# Patient Record
Sex: MALE | Race: WHITE | NOT HISPANIC OR LATINO
[De-identification: names, ages, dates, MRNs, and addresses within clinical notes are randomized per-mention and may not be internally consistent; named-entity substitution may affect disease eponyms.]

---
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
## 2020-01-27 ENCOUNTER — CLINICAL ADVICE (OUTPATIENT)
Age: 20
End: 2020-01-27

## 2020-03-23 ENCOUNTER — RX RENEWAL (OUTPATIENT)
Age: 20
End: 2020-03-23

## 2020-05-29 ENCOUNTER — APPOINTMENT (OUTPATIENT)
Dept: GASTROENTEROLOGY | Facility: CLINIC | Age: 20
End: 2020-05-29

## 2020-06-02 ENCOUNTER — APPOINTMENT (OUTPATIENT)
Dept: GASTROENTEROLOGY | Facility: CLINIC | Age: 20
End: 2020-06-02
Payer: COMMERCIAL

## 2020-06-02 PROCEDURE — 99203 OFFICE O/P NEW LOW 30 MIN: CPT | Mod: 95

## 2020-06-02 NOTE — HISTORY OF PRESENT ILLNESS
[de-identified] : telehealth\par patient consents\par patient at home\par MD at [address] [place], [place]\par \par 20 yr male, symptoms age 14, diagnosed CD right colon age 15\par Hospitalizations\par Prednisone\par Remicade, did well for 6 months, developed antibodies\par Since 2016 on Humira with MTX,folic acid\par Stopped MTX during pandemic, still doing well\par Humira weekly\par Last colonoscopy without gross disease activity 2017\par Persistent mild calprotectin elevation\par \par Without anal disease\par without EIM\par NO family history of IBD\par \par SH \par Non smoker\par [place] student

## 2020-06-02 NOTE — ASSESSMENT
[FreeTextEntry1] : Stable CD colon\par Likely mild chronic , subclincal activity\par \par Plan\par Continue Humira weekly\par Check labs including drug and antibody levels\par Calprotectin\par OV 6 months\par \par Patient referred by Dr Cj Caballero

## 2020-06-16 LAB
ALBUMIN SERPL ELPH-MCNC: 4.8 G/DL
ALP BLD-CCNC: 66 U/L
ALT SERPL-CCNC: 13 U/L
ANION GAP SERPL CALC-SCNC: 13 MMOL/L
AST SERPL-CCNC: 17 U/L
BASOPHILS # BLD AUTO: 0.05 K/UL
BASOPHILS NFR BLD AUTO: 1 %
BILIRUB SERPL-MCNC: 0.2 MG/DL
BUN SERPL-MCNC: 22 MG/DL
CALCIUM SERPL-MCNC: 9.6 MG/DL
CHLORIDE SERPL-SCNC: 102 MMOL/L
CO2 SERPL-SCNC: 25 MMOL/L
CREAT SERPL-MCNC: 0.84 MG/DL
CRP SERPL-MCNC: <0.1 MG/DL
EOSINOPHIL # BLD AUTO: 0.12 K/UL
EOSINOPHIL NFR BLD AUTO: 2.5 %
GLUCOSE SERPL-MCNC: 94 MG/DL
HBV CORE IGG+IGM SER QL: NONREACTIVE
HBV SURFACE AB SER QL: NONREACTIVE
HBV SURFACE AG SER QL: NONREACTIVE
HCT VFR BLD CALC: 46.7 %
HGB BLD-MCNC: 14.7 G/DL
IMM GRANULOCYTES NFR BLD AUTO: 0.2 %
LYMPHOCYTES # BLD AUTO: 1.57 K/UL
LYMPHOCYTES NFR BLD AUTO: 32.7 %
MAN DIFF?: NORMAL
MCHC RBC-ENTMCNC: 27.8 PG
MCHC RBC-ENTMCNC: 31.5 GM/DL
MCV RBC AUTO: 88.4 FL
MONOCYTES # BLD AUTO: 0.36 K/UL
MONOCYTES NFR BLD AUTO: 7.5 %
NEUTROPHILS # BLD AUTO: 2.69 K/UL
NEUTROPHILS NFR BLD AUTO: 56.1 %
PLATELET # BLD AUTO: 252 K/UL
POTASSIUM SERPL-SCNC: 4.4 MMOL/L
PROT SERPL-MCNC: 7.2 G/DL
RBC # BLD: 5.28 M/UL
RBC # FLD: 12.8 %
SODIUM SERPL-SCNC: 140 MMOL/L
WBC # FLD AUTO: 4.8 K/UL

## 2020-06-17 LAB
M TB IFN-G BLD-IMP: NEGATIVE
QUANTIFERON TB PLUS MITOGEN MINUS NIL: 6.97 IU/ML
QUANTIFERON TB PLUS NIL: 0.01 IU/ML
QUANTIFERON TB PLUS TB1 MINUS NIL: 0 IU/ML
QUANTIFERON TB PLUS TB2 MINUS NIL: -0.01 IU/ML

## 2020-06-22 LAB
ADALIMUMAB AB SERPL-MCNC: <25 NG/ML
ADALIMUMAB SERPL-MCNC: 16 UG/ML
CALPROTECTIN FECAL: 243 UG/G

## 2020-10-29 ENCOUNTER — NON-APPOINTMENT (OUTPATIENT)
Age: 20
End: 2020-10-29

## 2020-10-29 LAB
25(OH)D3 SERPL-MCNC: 60.3 NG/ML
ALBUMIN SERPL ELPH-MCNC: 4.9 G/DL
ALP BLD-CCNC: 59 U/L
ALT SERPL-CCNC: 12 U/L
ANION GAP SERPL CALC-SCNC: 12 MMOL/L
AST SERPL-CCNC: 19 U/L
BASOPHILS # BLD AUTO: 0.04 K/UL
BASOPHILS NFR BLD AUTO: 0.8 %
BILIRUB SERPL-MCNC: 0.3 MG/DL
BUN SERPL-MCNC: 18 MG/DL
CALCIUM SERPL-MCNC: 10 MG/DL
CHLORIDE SERPL-SCNC: 101 MMOL/L
CO2 SERPL-SCNC: 28 MMOL/L
CREAT SERPL-MCNC: 0.87 MG/DL
CRP SERPL-MCNC: <0.1 MG/DL
EOSINOPHIL # BLD AUTO: 0.1 K/UL
EOSINOPHIL NFR BLD AUTO: 1.9 %
GLUCOSE SERPL-MCNC: 90 MG/DL
HCT VFR BLD CALC: 47.1 %
HGB BLD-MCNC: 14.9 G/DL
IMM GRANULOCYTES NFR BLD AUTO: 0 %
LYMPHOCYTES # BLD AUTO: 2.29 K/UL
LYMPHOCYTES NFR BLD AUTO: 44.5 %
MAN DIFF?: NORMAL
MCHC RBC-ENTMCNC: 28.4 PG
MCHC RBC-ENTMCNC: 31.6 GM/DL
MCV RBC AUTO: 89.9 FL
MONOCYTES # BLD AUTO: 0.36 K/UL
MONOCYTES NFR BLD AUTO: 7 %
NEUTROPHILS # BLD AUTO: 2.36 K/UL
NEUTROPHILS NFR BLD AUTO: 45.8 %
PLATELET # BLD AUTO: 232 K/UL
POTASSIUM SERPL-SCNC: 4.3 MMOL/L
PROT SERPL-MCNC: 7.4 G/DL
RBC # BLD: 5.24 M/UL
RBC # FLD: 12.2 %
SODIUM SERPL-SCNC: 141 MMOL/L
VIT B12 SERPL-MCNC: >2000 PG/ML
WBC # FLD AUTO: 5.15 K/UL

## 2020-11-03 LAB — CALPROTECTIN FECAL: 120 UG/G

## 2020-11-05 LAB
ADALIMUMAB AB SERPL-MCNC: <25 NG/ML
ADALIMUMAB SERPL-MCNC: 13 UG/ML

## 2020-11-06 ENCOUNTER — APPOINTMENT (OUTPATIENT)
Dept: DISASTER EMERGENCY | Facility: CLINIC | Age: 20
End: 2020-11-06

## 2020-11-06 ENCOUNTER — LABORATORY RESULT (OUTPATIENT)
Age: 20
End: 2020-11-06

## 2020-11-06 DIAGNOSIS — Z01.818 ENCOUNTER FOR OTHER PREPROCEDURAL EXAMINATION: ICD-10-CM

## 2020-11-07 PROBLEM — Z01.818 PREOP TESTING: Status: ACTIVE | Noted: 2020-11-07

## 2020-11-11 ENCOUNTER — APPOINTMENT (OUTPATIENT)
Dept: GASTROENTEROLOGY | Facility: AMBULATORY MEDICAL SERVICES | Age: 20
End: 2020-11-11
Payer: COMMERCIAL

## 2020-11-11 PROCEDURE — 45385 COLONOSCOPY W/LESION REMOVAL: CPT

## 2020-11-11 PROCEDURE — 45380 COLONOSCOPY AND BIOPSY: CPT | Mod: 59

## 2020-11-17 ENCOUNTER — NON-APPOINTMENT (OUTPATIENT)
Age: 20
End: 2020-11-17

## 2021-01-20 ENCOUNTER — NON-APPOINTMENT (OUTPATIENT)
Age: 21
End: 2021-01-20

## 2021-03-26 ENCOUNTER — APPOINTMENT (OUTPATIENT)
Dept: GASTROENTEROLOGY | Facility: CLINIC | Age: 21
End: 2021-03-26

## 2021-03-27 LAB
25(OH)D3 SERPL-MCNC: 52.6 NG/ML
ALBUMIN SERPL ELPH-MCNC: 4.9 G/DL
ALP BLD-CCNC: 57 U/L
ALT SERPL-CCNC: 12 U/L
ANION GAP SERPL CALC-SCNC: 10 MMOL/L
AST SERPL-CCNC: 19 U/L
BASOPHILS # BLD AUTO: 0.04 K/UL
BASOPHILS NFR BLD AUTO: 0.7 %
BILIRUB SERPL-MCNC: 0.3 MG/DL
BUN SERPL-MCNC: 18 MG/DL
CALCIUM SERPL-MCNC: 9.7 MG/DL
CHLORIDE SERPL-SCNC: 103 MMOL/L
CO2 SERPL-SCNC: 26 MMOL/L
CREAT SERPL-MCNC: 0.77 MG/DL
CRP SERPL-MCNC: <3 MG/L
EOSINOPHIL # BLD AUTO: 0.11 K/UL
EOSINOPHIL NFR BLD AUTO: 1.8 %
GLUCOSE SERPL-MCNC: 110 MG/DL
HBV SURFACE AG SER QL: NONREACTIVE
HCT VFR BLD CALC: 43.4 %
HGB BLD-MCNC: 14 G/DL
IMM GRANULOCYTES NFR BLD AUTO: 0.2 %
LYMPHOCYTES # BLD AUTO: 1.43 K/UL
LYMPHOCYTES NFR BLD AUTO: 23.7 %
MAN DIFF?: NORMAL
MCHC RBC-ENTMCNC: 27.7 PG
MCHC RBC-ENTMCNC: 32.3 GM/DL
MCV RBC AUTO: 85.9 FL
MONOCYTES # BLD AUTO: 0.31 K/UL
MONOCYTES NFR BLD AUTO: 5.1 %
NEUTROPHILS # BLD AUTO: 4.14 K/UL
NEUTROPHILS NFR BLD AUTO: 68.5 %
PLATELET # BLD AUTO: 248 K/UL
POTASSIUM SERPL-SCNC: 4.1 MMOL/L
PROT SERPL-MCNC: 7.4 G/DL
RBC # BLD: 5.05 M/UL
RBC # FLD: 12.3 %
SODIUM SERPL-SCNC: 140 MMOL/L
VIT B12 SERPL-MCNC: >2000 PG/ML
WBC # FLD AUTO: 6.04 K/UL

## 2021-03-28 LAB
M TB IFN-G BLD-IMP: NEGATIVE
QUANTIFERON TB PLUS MITOGEN MINUS NIL: 7.32 IU/ML
QUANTIFERON TB PLUS NIL: 0.01 IU/ML
QUANTIFERON TB PLUS TB1 MINUS NIL: 0 IU/ML
QUANTIFERON TB PLUS TB2 MINUS NIL: 0 IU/ML

## 2021-03-31 LAB — CALPROTECTIN FECAL: 101 UG/G

## 2021-04-08 ENCOUNTER — APPOINTMENT (OUTPATIENT)
Dept: GASTROENTEROLOGY | Facility: CLINIC | Age: 21
End: 2021-04-08
Payer: COMMERCIAL

## 2021-04-08 VITALS
BODY MASS INDEX: 21.97 KG/M2 | TEMPERATURE: 98.1 F | HEIGHT: 67 IN | HEART RATE: 71 BPM | OXYGEN SATURATION: 96 % | DIASTOLIC BLOOD PRESSURE: 62 MMHG | RESPIRATION RATE: 16 BRPM | SYSTOLIC BLOOD PRESSURE: 106 MMHG | WEIGHT: 140 LBS

## 2021-04-08 PROCEDURE — 99213 OFFICE O/P EST LOW 20 MIN: CPT

## 2021-04-08 PROCEDURE — 99072 ADDL SUPL MATRL&STAF TM PHE: CPT

## 2021-04-08 NOTE — HISTORY OF PRESENT ILLNESS
[de-identified] : 21-year-old male Crohn's disease\par Prior history of Remicade use, antibody development\par Switch to Humira and methotrexate\par Currently on Humira monotherapy\par Colonoscopy on Humira monotherapy November 2020\par Some inflammatory activity of the cecum, normal ileum, otherwise normal exam\par Patient with regular bowel habit\par Recent check a fecal calprotectin in the normal range

## 2021-04-08 NOTE — ASSESSMENT
[FreeTextEntry1] : 21-year-old male, clinically well controlled Crohn's disease\par \par Plan\par Humira renewed\par Office visit 6 months

## 2021-04-12 ENCOUNTER — NON-APPOINTMENT (OUTPATIENT)
Age: 21
End: 2021-04-12

## 2021-04-12 LAB
ADALIMUMAB AB SERPL-MCNC: <25 NG/ML
ADALIMUMAB SERPL-MCNC: 20 UG/ML

## 2021-04-22 ENCOUNTER — RX RENEWAL (OUTPATIENT)
Age: 21
End: 2021-04-22

## 2021-09-21 ENCOUNTER — NON-APPOINTMENT (OUTPATIENT)
Age: 21
End: 2021-09-21

## 2021-09-22 LAB — CALPROTECTIN FECAL: 325 UG/G

## 2021-10-01 ENCOUNTER — APPOINTMENT (OUTPATIENT)
Dept: GASTROENTEROLOGY | Facility: CLINIC | Age: 21
End: 2021-10-01
Payer: COMMERCIAL

## 2021-10-01 VITALS
HEART RATE: 69 BPM | TEMPERATURE: 98.7 F | DIASTOLIC BLOOD PRESSURE: 60 MMHG | HEIGHT: 67 IN | OXYGEN SATURATION: 98 % | WEIGHT: 147 LBS | BODY MASS INDEX: 23.07 KG/M2 | SYSTOLIC BLOOD PRESSURE: 110 MMHG

## 2021-10-01 PROCEDURE — 99214 OFFICE O/P EST MOD 30 MIN: CPT

## 2021-10-01 RX ORDER — FOLIC ACID 1 MG/1
1 TABLET ORAL
Qty: 90 | Refills: 3 | Status: DISCONTINUED | COMMUNITY
Start: 2018-03-12 | End: 2021-10-01

## 2021-10-01 RX ORDER — METHOTREXATE 2.5 MG/1
2.5 TABLET ORAL
Qty: 24 | Refills: 5 | Status: DISCONTINUED | COMMUNITY
Start: 2018-12-19 | End: 2021-10-01

## 2021-10-01 NOTE — HISTORY OF PRESENT ILLNESS
[de-identified] : 21-year-old male\par Crohn's disease diagnosed at age 15\par Hospitalizations\par Prednisone\par Remicade for 6 months, did well, developed antibodies\par Subsequently did well on Humira combined with methotrexate and folic acid\par Methotrexate discontinued at the beginning of pandemic\par Currently on Humira weekly\par \par Patient continues to feel well\par Regular bowel habit\par Perhaps some weight gain\par Fecal calprotectin slightly elevated 325 as compared to previous value March 2021, 101, level of 243 back in June 2020\par  last colonoscopy November 2020 showed normal terminal ileum, mild mucosal erythema of the cecum\par \par Social history College student

## 2021-10-01 NOTE — PHYSICAL EXAM
[General Appearance - Alert] : alert [General Appearance - In No Acute Distress] : in no acute distress [Bowel Sounds] : normal bowel sounds [Abdomen Soft] : soft [Abdomen Tenderness] : non-tender [] : no hepato-splenomegaly [Abdomen Mass (___ Cm)] : no abdominal mass palpated [Oriented To Time, Place, And Person] : oriented to person, place, and time [Impaired Insight] : insight and judgment were intact [Affect] : the affect was normal

## 2021-10-01 NOTE — ASSESSMENT
[FreeTextEntry1] : Crohn's disease\par Bump, elevation of calprotectin, but patient clinically continues to feel very well\par \par Plan\par Continue Humira weekly\par Routine blood testing today\par Office visit 6 months, sooner as needed

## 2021-10-03 LAB — CRP SERPL-MCNC: <3 MG/L

## 2021-10-04 ENCOUNTER — TRANSCRIPTION ENCOUNTER (OUTPATIENT)
Age: 21
End: 2021-10-04

## 2021-10-06 PROBLEM — K62.5 BLEEDING PER RECTUM: Status: ACTIVE | Noted: 2017-06-06

## 2021-10-12 ENCOUNTER — NON-APPOINTMENT (OUTPATIENT)
Age: 21
End: 2021-10-12

## 2021-11-16 ENCOUNTER — RX RENEWAL (OUTPATIENT)
Age: 21
End: 2021-11-16

## 2021-12-15 ENCOUNTER — RX RENEWAL (OUTPATIENT)
Age: 21
End: 2021-12-15

## 2022-02-09 ENCOUNTER — NON-APPOINTMENT (OUTPATIENT)
Age: 22
End: 2022-02-09

## 2022-02-17 ENCOUNTER — NON-APPOINTMENT (OUTPATIENT)
Age: 22
End: 2022-02-17

## 2022-03-29 ENCOUNTER — NON-APPOINTMENT (OUTPATIENT)
Age: 22
End: 2022-03-29

## 2022-04-07 LAB
ALBUMIN SERPL ELPH-MCNC: 4.4 G/DL
ALP BLD-CCNC: 61 U/L
ALT SERPL-CCNC: 15 U/L
ANION GAP SERPL CALC-SCNC: 14 MMOL/L
AST SERPL-CCNC: 24 U/L
BASOPHILS # BLD AUTO: 0.07 K/UL
BASOPHILS NFR BLD AUTO: 1 %
BILIRUB SERPL-MCNC: 0.2 MG/DL
BUN SERPL-MCNC: 20 MG/DL
CALCIUM SERPL-MCNC: 9.4 MG/DL
CHLORIDE SERPL-SCNC: 102 MMOL/L
CO2 SERPL-SCNC: 24 MMOL/L
CREAT SERPL-MCNC: 0.9 MG/DL
CRP SERPL-MCNC: 5 MG/L
EGFR: 124 ML/MIN/1.73M2
EOSINOPHIL # BLD AUTO: 0.14 K/UL
EOSINOPHIL NFR BLD AUTO: 2.1 %
GLUCOSE SERPL-MCNC: 94 MG/DL
HCT VFR BLD CALC: 41.7 %
HGB BLD-MCNC: 13.3 G/DL
IMM GRANULOCYTES NFR BLD AUTO: 0.4 %
LYMPHOCYTES # BLD AUTO: 1.89 K/UL
LYMPHOCYTES NFR BLD AUTO: 27.8 %
MAN DIFF?: NORMAL
MCHC RBC-ENTMCNC: 28.4 PG
MCHC RBC-ENTMCNC: 31.9 GM/DL
MCV RBC AUTO: 88.9 FL
MONOCYTES # BLD AUTO: 0.48 K/UL
MONOCYTES NFR BLD AUTO: 7 %
NEUTROPHILS # BLD AUTO: 4.2 K/UL
NEUTROPHILS NFR BLD AUTO: 61.7 %
PLATELET # BLD AUTO: 258 K/UL
POTASSIUM SERPL-SCNC: 4 MMOL/L
PROT SERPL-MCNC: 7.3 G/DL
RBC # BLD: 4.69 M/UL
RBC # FLD: 13 %
SODIUM SERPL-SCNC: 139 MMOL/L
WBC # FLD AUTO: 6.81 K/UL

## 2022-04-11 LAB — CALPROTECTIN FECAL: 418 UG/G

## 2022-04-15 ENCOUNTER — APPOINTMENT (OUTPATIENT)
Dept: GASTROENTEROLOGY | Facility: CLINIC | Age: 22
End: 2022-04-15
Payer: COMMERCIAL

## 2022-04-15 ENCOUNTER — TRANSCRIPTION ENCOUNTER (OUTPATIENT)
Age: 22
End: 2022-04-15

## 2022-04-15 VITALS
TEMPERATURE: 97.7 F | OXYGEN SATURATION: 98 % | DIASTOLIC BLOOD PRESSURE: 72 MMHG | HEART RATE: 75 BPM | HEIGHT: 67 IN | BODY MASS INDEX: 23.54 KG/M2 | WEIGHT: 150 LBS | SYSTOLIC BLOOD PRESSURE: 110 MMHG

## 2022-04-15 PROCEDURE — 99214 OFFICE O/P EST MOD 30 MIN: CPT

## 2022-04-15 NOTE — PHYSICAL EXAM
[General Appearance - In No Acute Distress] : in no acute distress [General Appearance - Alert] : alert [Bowel Sounds] : normal bowel sounds [Abdomen Soft] : soft [Abdomen Tenderness] : non-tender [] : no hepato-splenomegaly [Oriented To Time, Place, And Person] : oriented to person, place, and time [Abdomen Mass (___ Cm)] : no abdominal mass palpated [Impaired Insight] : insight and judgment were intact [Affect] : the affect was normal

## 2022-04-15 NOTE — HISTORY OF PRESENT ILLNESS
[de-identified] : 22-year-old male\par Diagnosed Crohn's disease at age 15\par Ileocolic\par Treated with Remicade\par Did well for 6 months  until antibody development, infusion reaction\par Subsequently, successfully treated with Humira, methotrexate and folic acid\par Methotrexate discontinued at the beginning of pandemic\par \par Last colonoscopy November 2020\par Normal terminal ileum\par Mild mucosal erythema of the cecum\par Extensive pseudopolyps\par Inflammatory polyp\par \par Patient continues to feel well, some weight gain regular bowel habit no abdominal pain\par Mild rise of calprotectin again noted, now slightly over 400 CRP of 5 rest of blood testing unremarkable\par Humira trough level 1 year ago 20, no antibodies\par \par Student at [employer], [place], 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

## 2022-04-15 NOTE — ASSESSMENT
[FreeTextEntry1] : Crohn's disease\par Humira weekly\par Prior history of Remicade, antibodies, treatment failure\par Patient currently off methotrexate\par Slight rise of calprotectin and CRP noted\par \par Plan\par No immediate change of therapy\par Blood testing in the next day or 2 for Humira trough\par Telephone follow-up when results available\par Plan for office visit in the summer to set up surveillance colonoscopy for the fall

## 2022-04-21 ENCOUNTER — NON-APPOINTMENT (OUTPATIENT)
Age: 22
End: 2022-04-21

## 2022-04-21 LAB
ANTIBODIES TO ADALIMUMAB (ATA) CONCENTRATION: < 1.7 U/ML
PROMETHEUS ANSER ADA: NORMAL
PROMETHEUS LABORATORY FOOTER: NORMAL
SERUM ADALIMUMAB (ADA) CONCENTRATION: 21.1 UG/ML

## 2022-06-03 ENCOUNTER — NON-APPOINTMENT (OUTPATIENT)
Age: 22
End: 2022-06-03

## 2022-06-07 ENCOUNTER — NON-APPOINTMENT (OUTPATIENT)
Age: 22
End: 2022-06-07

## 2022-08-18 ENCOUNTER — APPOINTMENT (OUTPATIENT)
Dept: GASTROENTEROLOGY | Facility: CLINIC | Age: 22
End: 2022-08-18

## 2022-08-18 VITALS
OXYGEN SATURATION: 98 % | SYSTOLIC BLOOD PRESSURE: 128 MMHG | DIASTOLIC BLOOD PRESSURE: 60 MMHG | WEIGHT: 150 LBS | HEART RATE: 66 BPM | BODY MASS INDEX: 22.73 KG/M2 | HEIGHT: 68 IN

## 2022-08-18 PROCEDURE — 99214 OFFICE O/P EST MOD 30 MIN: CPT

## 2022-08-18 NOTE — ASSESSMENT
[FreeTextEntry1] : Crohn's colitis\par Due for surveillance\par \par \par Plan\par Recheck calprotectin\par Update blood work including tuberculosis screen, recheck for hepatitis B serologies, varicella status\par Indications risks benefits and alternatives to colonoscopy reviewed\par Patient agreeable to examination

## 2022-08-18 NOTE — HISTORY OF PRESENT ILLNESS
[de-identified] : 22-year-old male\par History of severe Crohn's colitis\par Previously on methotrexate, Humira weekly\par Methotrexate stopped during pandemic, Humira decreased from weekly to every other week\par Last drug level greater than 20\par \par Past history of hospitalizations, prednisone, Remicade, antibodies to Remicade\par \par Last colonoscopy November 2020\par Extensive pseudopolyps\par Normal terminal ileum\par Mild mucosal erythema of the cecum\par \par At last check patient noted to have rising fecal calprotectin 418 April 2022\par Since that time, continues to feel well\par

## 2022-08-19 LAB
HBV CORE IGG+IGM SER QL: NONREACTIVE
HBV SURFACE AB SER QL: NONREACTIVE
HBV SURFACE AG SER QL: NONREACTIVE
VZV AB TITR SER: NEGATIVE
VZV IGG SER IF-ACNC: 123.3 INDEX

## 2022-09-17 LAB — CALPROTECTIN FECAL: 472 UG/G

## 2022-09-19 ENCOUNTER — NON-APPOINTMENT (OUTPATIENT)
Age: 22
End: 2022-09-19

## 2022-11-06 LAB — SARS-COV-2 N GENE NPH QL NAA+PROBE: NOT DETECTED

## 2022-11-09 ENCOUNTER — NON-APPOINTMENT (OUTPATIENT)
Age: 22
End: 2022-11-09

## 2022-11-09 ENCOUNTER — APPOINTMENT (OUTPATIENT)
Dept: GASTROENTEROLOGY | Facility: AMBULATORY MEDICAL SERVICES | Age: 22
End: 2022-11-09

## 2022-11-09 PROCEDURE — 45380 COLONOSCOPY AND BIOPSY: CPT

## 2022-11-15 ENCOUNTER — NON-APPOINTMENT (OUTPATIENT)
Age: 22
End: 2022-11-15

## 2022-11-16 ENCOUNTER — NON-APPOINTMENT (OUTPATIENT)
Age: 22
End: 2022-11-16

## 2022-11-18 ENCOUNTER — NON-APPOINTMENT (OUTPATIENT)
Age: 22
End: 2022-11-18

## 2023-02-26 LAB — CALPROTECTIN FECAL: 212 UG/G

## 2023-03-03 ENCOUNTER — APPOINTMENT (OUTPATIENT)
Dept: GASTROENTEROLOGY | Facility: CLINIC | Age: 23
End: 2023-03-03
Payer: COMMERCIAL

## 2023-03-03 VITALS
OXYGEN SATURATION: 98 % | DIASTOLIC BLOOD PRESSURE: 81 MMHG | HEART RATE: 70 BPM | SYSTOLIC BLOOD PRESSURE: 147 MMHG | TEMPERATURE: 98.3 F | HEIGHT: 68 IN | BODY MASS INDEX: 21.98 KG/M2 | WEIGHT: 145 LBS

## 2023-03-03 PROCEDURE — 99213 OFFICE O/P EST LOW 20 MIN: CPT

## 2023-03-03 NOTE — REASON FOR VISIT
[Follow-up] : a follow-up of an existing diagnosis [Parent] : parent [FreeTextEntry1] : Crohn's disease

## 2023-03-03 NOTE — ASSESSMENT
[FreeTextEntry1] : Crohn's colitis\par Continues to be asymptomatic throughout all medication changes, however some objective signs of improving disease activity in the form of declining calprotectin\par \par Plan\par Routine blood testing, follow-up\par Office visit again in 6 months
0

## 2023-03-03 NOTE — HISTORY OF PRESENT ILLNESS
[FreeTextEntry1] : 23-year-old male\par History of Crohn's colitis\par Previously methotrexate and Humira\par Methotrexate discontinued during pandemic\par Humira subsequently decreased from weekly to every other week following high therapeutic level of 20\par Last colonoscopy performed November 2022\par Normal terminal ileum, ulceration erythema of the cecum and the ascending colon, pseudopolyps, active Crohn's disease\par Calprotectin September 2022 472\par \par Since his colonoscopy patient back on Humira weekly\par Continues to feel well\par Fecal calprotectin now down to 212\par \par Patient due for blood work\par \par Social history: Graduating soon from [employer]\par

## 2023-03-05 LAB
24R-OH-CALCIDIOL SERPL-MCNC: 68.6 PG/ML
ALBUMIN SERPL ELPH-MCNC: 4.8 G/DL
ALP BLD-CCNC: 82 U/L
ALT SERPL-CCNC: 16 U/L
ANION GAP SERPL CALC-SCNC: 12 MMOL/L
AST SERPL-CCNC: 23 U/L
BASOPHILS # BLD AUTO: 0.1 K/UL
BASOPHILS NFR BLD AUTO: 1.3 %
BILIRUB SERPL-MCNC: 0.2 MG/DL
BUN SERPL-MCNC: 23 MG/DL
CALCIUM SERPL-MCNC: 10.1 MG/DL
CHLORIDE SERPL-SCNC: 101 MMOL/L
CO2 SERPL-SCNC: 28 MMOL/L
CREAT SERPL-MCNC: 0.84 MG/DL
CRP SERPL-MCNC: 5 MG/L
EGFR: 126 ML/MIN/1.73M2
EOSINOPHIL # BLD AUTO: 1.26 K/UL
EOSINOPHIL NFR BLD AUTO: 16.6 %
GLUCOSE SERPL-MCNC: 86 MG/DL
HCT VFR BLD CALC: 42.5 %
HGB BLD-MCNC: 13.6 G/DL
IMM GRANULOCYTES NFR BLD AUTO: 0.4 %
LYMPHOCYTES # BLD AUTO: 1.94 K/UL
LYMPHOCYTES NFR BLD AUTO: 25.6 %
MAN DIFF?: NORMAL
MCHC RBC-ENTMCNC: 27.9 PG
MCHC RBC-ENTMCNC: 32 GM/DL
MCV RBC AUTO: 87.1 FL
MONOCYTES # BLD AUTO: 0.48 K/UL
MONOCYTES NFR BLD AUTO: 6.3 %
NEUTROPHILS # BLD AUTO: 3.78 K/UL
NEUTROPHILS NFR BLD AUTO: 49.8 %
PLATELET # BLD AUTO: 285 K/UL
POTASSIUM SERPL-SCNC: 4.5 MMOL/L
PROT SERPL-MCNC: 7.6 G/DL
RBC # BLD: 4.88 M/UL
RBC # FLD: 13.5 %
SODIUM SERPL-SCNC: 140 MMOL/L
VIT B12 SERPL-MCNC: >2000 PG/ML
WBC # FLD AUTO: 7.59 K/UL

## 2023-04-20 ENCOUNTER — NON-APPOINTMENT (OUTPATIENT)
Age: 23
End: 2023-04-20

## 2023-04-20 DIAGNOSIS — R19.7 DIARRHEA, UNSPECIFIED: ICD-10-CM

## 2023-04-20 DIAGNOSIS — R11.2 NAUSEA WITH VOMITING, UNSPECIFIED: ICD-10-CM

## 2023-04-20 RX ORDER — ONDANSETRON 4 MG/1
4 TABLET ORAL 3 TIMES DAILY
Qty: 90 | Refills: 0 | Status: ACTIVE | COMMUNITY
Start: 2023-04-20 | End: 1900-01-01

## 2023-04-22 ENCOUNTER — NON-APPOINTMENT (OUTPATIENT)
Age: 23
End: 2023-04-22

## 2023-04-22 LAB
ENTEROPATHOGENIC E. COLI (EPEC): DETECTED
GI PCR PANEL: DETECTED
ROTAVIRUS A: DETECTED

## 2023-04-25 ENCOUNTER — APPOINTMENT (OUTPATIENT)
Dept: GASTROENTEROLOGY | Facility: CLINIC | Age: 23
End: 2023-04-25
Payer: COMMERCIAL

## 2023-04-25 VITALS
HEART RATE: 73 BPM | BODY MASS INDEX: 20.92 KG/M2 | TEMPERATURE: 93.3 F | WEIGHT: 138 LBS | SYSTOLIC BLOOD PRESSURE: 131 MMHG | HEIGHT: 68 IN | OXYGEN SATURATION: 98 % | DIASTOLIC BLOOD PRESSURE: 79 MMHG

## 2023-04-25 LAB — BACTERIA STL CULT: NORMAL

## 2023-04-25 PROCEDURE — 99213 OFFICE O/P EST LOW 20 MIN: CPT

## 2023-04-25 NOTE — ASSESSMENT
[FreeTextEntry1] : Crohn's disease\par Resolved episode of acute gastroenteritis\par Complaints more consistent with confirmed virus rather than E. coli\par Overall, much improved\par \par Plan\par Resume regular diet\par Continue Humira\par Routine office visit again in 4 months, sooner as needed

## 2023-04-25 NOTE — HISTORY OF PRESENT ILLNESS
[FreeTextEntry1] : 23-year-old male\par Well-controlled Crohn's disease on Humira maintenance\par Called last week with abrupt onset of nausea vomiting diarrhea\par Stool testing confirmed rotavirus A, enteropathogenic E. coli\par Patient responding well to supportive care\par No antibiotics\par Vomiting resolved\par Diarrhea now resolved\par Patient lost 8 pounds during the episode, appetite returning\par \par As per prior discussion with patient's mother, does confirm consumption of unpasteurized milk products, but not for several weeks\par No sick contacts reported

## 2023-04-25 NOTE — REASON FOR VISIT
[Follow-up] : a follow-up of an existing diagnosis [Parent] : parent [FreeTextEntry1] : Crohn's disease, acute gastroenteritis

## 2023-04-27 ENCOUNTER — NON-APPOINTMENT (OUTPATIENT)
Age: 23
End: 2023-04-27

## 2023-04-27 LAB
C DIFF TOXIN B QL PCR REFLEX: NORMAL
CALPROTECTIN FECAL: 161 UG/G
GDH ANTIGEN: NOT DETECTED
TOXIN A AND B: NOT DETECTED

## 2023-05-01 ENCOUNTER — NON-APPOINTMENT (OUTPATIENT)
Age: 23
End: 2023-05-01

## 2023-05-02 ENCOUNTER — NON-APPOINTMENT (OUTPATIENT)
Age: 23
End: 2023-05-02

## 2023-08-15 ENCOUNTER — TRANSCRIPTION ENCOUNTER (OUTPATIENT)
Age: 23
End: 2023-08-15

## 2023-08-16 ENCOUNTER — TRANSCRIPTION ENCOUNTER (OUTPATIENT)
Age: 23
End: 2023-08-16

## 2023-08-18 LAB
25(OH)D3 SERPL-MCNC: 29.2 NG/ML
ALBUMIN SERPL ELPH-MCNC: 4.5 G/DL
ALP BLD-CCNC: 58 U/L
ALT SERPL-CCNC: 16 U/L
ANION GAP SERPL CALC-SCNC: 13 MMOL/L
AST SERPL-CCNC: 19 U/L
BILIRUB SERPL-MCNC: 0.2 MG/DL
BUN SERPL-MCNC: 21 MG/DL
CALCIUM SERPL-MCNC: 9.6 MG/DL
CHLORIDE SERPL-SCNC: 103 MMOL/L
CO2 SERPL-SCNC: 25 MMOL/L
CREAT SERPL-MCNC: 0.77 MG/DL
CRP SERPL-MCNC: <3 MG/L
EGFR: 129 ML/MIN/1.73M2
HBV CORE IGG+IGM SER QL: NONREACTIVE
HBV SURFACE AB SER QL: NONREACTIVE
HBV SURFACE AG SER QL: NONREACTIVE
HCT VFR BLD CALC: 42.3 %
HGB BLD-MCNC: 13.6 G/DL
MCHC RBC-ENTMCNC: 28.2 PG
MCHC RBC-ENTMCNC: 32.2 GM/DL
MCV RBC AUTO: 87.6 FL
PLATELET # BLD AUTO: 224 K/UL
POTASSIUM SERPL-SCNC: 4.2 MMOL/L
PROT SERPL-MCNC: 7.2 G/DL
RBC # BLD: 4.83 M/UL
RBC # FLD: 13.2 %
SODIUM SERPL-SCNC: 141 MMOL/L
VIT B12 SERPL-MCNC: >2000 PG/ML
WBC # FLD AUTO: 5.78 K/UL

## 2023-08-20 LAB
M TB IFN-G BLD-IMP: NEGATIVE
QUANTIFERON TB PLUS MITOGEN MINUS NIL: >10 IU/ML
QUANTIFERON TB PLUS NIL: 0.02 IU/ML
QUANTIFERON TB PLUS TB1 MINUS NIL: 0 IU/ML
QUANTIFERON TB PLUS TB2 MINUS NIL: 0 IU/ML

## 2023-08-21 LAB — CALPROTECTIN FECAL: 272 UG/G

## 2023-08-22 ENCOUNTER — APPOINTMENT (OUTPATIENT)
Dept: GASTROENTEROLOGY | Facility: CLINIC | Age: 23
End: 2023-08-22
Payer: COMMERCIAL

## 2023-08-22 VITALS
WEIGHT: 155 LBS | SYSTOLIC BLOOD PRESSURE: 140 MMHG | HEART RATE: 74 BPM | HEIGHT: 68 IN | OXYGEN SATURATION: 98 % | DIASTOLIC BLOOD PRESSURE: 70 MMHG | BODY MASS INDEX: 23.49 KG/M2

## 2023-08-22 DIAGNOSIS — Z92.29 PERSONAL HISTORY OF OTHER DRUG THERAPY: ICD-10-CM

## 2023-08-22 PROCEDURE — 99214 OFFICE O/P EST MOD 30 MIN: CPT

## 2023-08-22 NOTE — HISTORY OF PRESENT ILLNESS
[FreeTextEntry1] : 23-year-old male History of Crohn's colitis Previously on methotrexate and Humira, now on Humira monotherapy weekly Last colonoscopy November 2022 Normal terminal ileum, ulceration of the cecum and ascending colon, pseudopolyps Patient reports stable bowel habit No bleeding or diarrhea Weight gain which he attributes to working out Review of blood testing, unremarkable Normalized C-reactive protein Mild fluctuation of calprotectin into the mid 200s, no normal values previously noted  Patient last seen around the time of acute viral bacterial infection, now resolved  Along with review of blood test results, noted again to be negative for hepatitis B, presumably vaccinated previously nonconverter  Social history: 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

## 2023-08-25 ENCOUNTER — APPOINTMENT (OUTPATIENT)
Dept: GASTROENTEROLOGY | Facility: CLINIC | Age: 23
End: 2023-08-25

## 2023-08-28 ENCOUNTER — NON-APPOINTMENT (OUTPATIENT)
Age: 23
End: 2023-08-28

## 2023-08-28 LAB
ADALIMUMAB AB SERPL-MCNC: <25 NG/ML
ADALIMUMAB SERPL-MCNC: 13 UG/ML

## 2024-02-09 ENCOUNTER — TRANSCRIPTION ENCOUNTER (OUTPATIENT)
Age: 24
End: 2024-02-09

## 2024-02-13 LAB
ALBUMIN SERPL ELPH-MCNC: 4.7 G/DL
ALP BLD-CCNC: 72 U/L
ALT SERPL-CCNC: 16 U/L
ANION GAP SERPL CALC-SCNC: 16 MMOL/L
AST SERPL-CCNC: 20 U/L
BILIRUB SERPL-MCNC: 0.2 MG/DL
BUN SERPL-MCNC: 21 MG/DL
CALCIUM SERPL-MCNC: 9.5 MG/DL
CHLORIDE SERPL-SCNC: 100 MMOL/L
CO2 SERPL-SCNC: 24 MMOL/L
CREAT SERPL-MCNC: 1.01 MG/DL
EGFR: 107 ML/MIN/1.73M2
HCT VFR BLD CALC: 41.1 %
HGB BLD-MCNC: 13.7 G/DL
MCHC RBC-ENTMCNC: 28.1 PG
MCHC RBC-ENTMCNC: 33.3 GM/DL
MCV RBC AUTO: 84.4 FL
PLATELET # BLD AUTO: 253 K/UL
POTASSIUM SERPL-SCNC: 4.4 MMOL/L
PROT SERPL-MCNC: 7.5 G/DL
RBC # BLD: 4.87 M/UL
RBC # FLD: 13.2 %
SODIUM SERPL-SCNC: 140 MMOL/L
WBC # FLD AUTO: 7.96 K/UL

## 2024-02-18 LAB — CALPROTECTIN FECAL: 153 UG/G

## 2024-02-20 ENCOUNTER — APPOINTMENT (OUTPATIENT)
Dept: GASTROENTEROLOGY | Facility: CLINIC | Age: 24
End: 2024-02-20

## 2024-02-20 ENCOUNTER — APPOINTMENT (OUTPATIENT)
Dept: GASTROENTEROLOGY | Facility: CLINIC | Age: 24
End: 2024-02-20
Payer: COMMERCIAL

## 2024-02-20 VITALS
SYSTOLIC BLOOD PRESSURE: 123 MMHG | DIASTOLIC BLOOD PRESSURE: 75 MMHG | HEIGHT: 68 IN | BODY MASS INDEX: 23.49 KG/M2 | HEART RATE: 87 BPM | OXYGEN SATURATION: 98 % | WEIGHT: 155 LBS

## 2024-02-20 DIAGNOSIS — K50.10 CROHN'S DISEASE OF LARGE INTESTINE W/OUT COMPLICATIONS: ICD-10-CM

## 2024-02-20 PROCEDURE — 99214 OFFICE O/P EST MOD 30 MIN: CPT

## 2024-02-20 RX ORDER — ADALIMUMAB 40MG/0.4ML
40 KIT SUBCUTANEOUS
Qty: 2 | Refills: 5 | Status: ACTIVE | COMMUNITY
Start: 2018-01-02 | End: 1900-01-01

## 2024-02-20 NOTE — ASSESSMENT
[FreeTextEntry1] : 24-year-old male Clinically stable Crohn's Likely underlying colitis activity based on prior colonoscopy, continued mild elevation of calprotectin Due for surveillance evaluation Indications risks benefits alternatives to colonoscopy reviewed.  Patient agreeable. Continue Humira, renewed

## 2024-02-20 NOTE — HISTORY OF PRESENT ILLNESS
[FreeTextEntry1] : 24-year-old male Longstanding Crohn's disease Prior allergic, infusion reaction to Remicade Previously on combination methotrexate with Humira now on Humira monotherapy Injection every week Last colonoscopy November 2022 Normal terminal ileum, ulceration of the cecum and ascending colon, pseudopolyps Generally feeling well Blood testing reviewed, normal, mildly elevated calprotectin 153  Patient due for surveillance colonoscopy  Social history: Working [employer], [employer], looking for work as an

## 2024-03-20 ENCOUNTER — TRANSCRIPTION ENCOUNTER (OUTPATIENT)
Age: 24
End: 2024-03-20

## 2024-03-21 ENCOUNTER — TRANSCRIPTION ENCOUNTER (OUTPATIENT)
Age: 24
End: 2024-03-21

## 2024-03-26 ENCOUNTER — TRANSCRIPTION ENCOUNTER (OUTPATIENT)
Age: 24
End: 2024-03-26

## 2024-05-28 RX ORDER — SODIUM PICOSULFATE, MAGNESIUM OXIDE, AND ANHYDROUS CITRIC ACID 12; 3.5; 1 G/175ML; G/175ML; MG/175ML
10-3.5-12 MG-GM LIQUID ORAL
Qty: 2 | Refills: 0 | Status: DISCONTINUED | COMMUNITY
Start: 2024-02-20 | End: 2024-05-28

## 2024-05-28 RX ORDER — SODIUM PICOSULFATE, MAGNESIUM OXIDE, AND ANHYDROUS CITRIC ACID 10; 3.5; 12 MG/160ML; G/160ML; G/160ML
10-3.5-12 MG-GM LIQUID ORAL
Qty: 1 | Refills: 0 | Status: DISCONTINUED | COMMUNITY
Start: 2022-08-18 | End: 2024-05-28

## 2024-05-28 RX ORDER — SODIUM SULFATE, POTASSIUM SULFATE AND MAGNESIUM SULFATE 1.6; 3.13; 17.5 G/177ML; G/177ML; G/177ML
17.5-3.13-1.6 SOLUTION ORAL
Qty: 1 | Refills: 0 | Status: DISCONTINUED | COMMUNITY
Start: 2024-02-20 | End: 2024-05-28

## 2024-05-28 RX ORDER — SODIUM SULFATE, POTASSIUM SULFATE AND MAGNESIUM SULFATE 1.6; 3.13; 17.5 G/177ML; G/177ML; G/177ML
17.5-3.13-1.6 SOLUTION ORAL
Qty: 1 | Refills: 0 | Status: ACTIVE | COMMUNITY
Start: 2024-05-28 | End: 1900-01-01

## 2024-06-05 ENCOUNTER — APPOINTMENT (OUTPATIENT)
Dept: GASTROENTEROLOGY | Facility: AMBULATORY MEDICAL SERVICES | Age: 24
End: 2024-06-05
Payer: COMMERCIAL

## 2024-06-05 PROCEDURE — 45380 COLONOSCOPY AND BIOPSY: CPT

## 2024-10-09 ENCOUNTER — RX RENEWAL (OUTPATIENT)
Age: 24
End: 2024-10-09

## 2024-10-09 RX ORDER — ADALIMUMAB 40MG/0.4ML
KIT SUBCUTANEOUS
Qty: 2 | Refills: 5 | Status: ACTIVE | COMMUNITY
Start: 2024-10-09 | End: 1900-01-01

## 2025-01-10 ENCOUNTER — APPOINTMENT (OUTPATIENT)
Dept: GASTROENTEROLOGY | Facility: CLINIC | Age: 25
End: 2025-01-10
Payer: COMMERCIAL

## 2025-01-10 VITALS
HEART RATE: 64 BPM | HEIGHT: 68 IN | WEIGHT: 150 LBS | DIASTOLIC BLOOD PRESSURE: 75 MMHG | OXYGEN SATURATION: 98 % | SYSTOLIC BLOOD PRESSURE: 131 MMHG | BODY MASS INDEX: 22.73 KG/M2

## 2025-01-10 DIAGNOSIS — K50.10 CROHN'S DISEASE OF LARGE INTESTINE W/OUT COMPLICATIONS: ICD-10-CM

## 2025-01-10 PROCEDURE — G2211 COMPLEX E/M VISIT ADD ON: CPT | Mod: NC

## 2025-01-10 PROCEDURE — 99214 OFFICE O/P EST MOD 30 MIN: CPT

## 2025-01-12 LAB
ALBUMIN SERPL ELPH-MCNC: 4.8 G/DL
ALP BLD-CCNC: 74 U/L
ALT SERPL-CCNC: 16 U/L
ANION GAP SERPL CALC-SCNC: 13 MMOL/L
AST SERPL-CCNC: 20 U/L
BILIRUB SERPL-MCNC: 0.3 MG/DL
BUN SERPL-MCNC: 25 MG/DL
CALCIUM SERPL-MCNC: 9.9 MG/DL
CHLORIDE SERPL-SCNC: 99 MMOL/L
CO2 SERPL-SCNC: 27 MMOL/L
CREAT SERPL-MCNC: 1.01 MG/DL
EGFR: 106 ML/MIN/1.73M2
HBV CORE IGG+IGM SER QL: NONREACTIVE
HBV SURFACE AB SER QL: NONREACTIVE
HBV SURFACE AG SER QL: NONREACTIVE
HCT VFR BLD CALC: 46.7 %
HGB BLD-MCNC: 14.7 G/DL
MCHC RBC-ENTMCNC: 27.2 PG
MCHC RBC-ENTMCNC: 31.5 G/DL
MCV RBC AUTO: 86.3 FL
PLATELET # BLD AUTO: 266 K/UL
POTASSIUM SERPL-SCNC: 4.6 MMOL/L
PROT SERPL-MCNC: 7.9 G/DL
RBC # BLD: 5.41 M/UL
RBC # FLD: 13.6 %
SODIUM SERPL-SCNC: 139 MMOL/L
WBC # FLD AUTO: 6.31 K/UL

## 2025-01-16 LAB
M TB IFN-G BLD-IMP: NEGATIVE
QUANTIFERON TB PLUS MITOGEN MINUS NIL: >10 IU/ML
QUANTIFERON TB PLUS NIL: 0.03 IU/ML
QUANTIFERON TB PLUS TB1 MINUS NIL: 0 IU/ML
QUANTIFERON TB PLUS TB2 MINUS NIL: 0 IU/ML

## 2025-01-18 LAB — CALPROTECTIN FECAL: 788 UG/G

## 2025-07-01 ENCOUNTER — RX RENEWAL (OUTPATIENT)
Age: 25
End: 2025-07-01

## 2025-07-11 ENCOUNTER — APPOINTMENT (OUTPATIENT)
Dept: GASTROENTEROLOGY | Facility: CLINIC | Age: 25
End: 2025-07-11
Payer: COMMERCIAL

## 2025-07-11 VITALS
HEART RATE: 86 BPM | TEMPERATURE: 98.3 F | DIASTOLIC BLOOD PRESSURE: 67 MMHG | WEIGHT: 150 LBS | SYSTOLIC BLOOD PRESSURE: 110 MMHG | HEIGHT: 68 IN | OXYGEN SATURATION: 98 % | BODY MASS INDEX: 22.73 KG/M2

## 2025-07-11 PROCEDURE — 99213 OFFICE O/P EST LOW 20 MIN: CPT

## 2025-07-12 LAB
25(OH)D3 SERPL-MCNC: 24.8 NG/ML
ALBUMIN SERPL ELPH-MCNC: 4.5 G/DL
ALP BLD-CCNC: 63 U/L
ALT SERPL-CCNC: 14 U/L
ANION GAP SERPL CALC-SCNC: 15 MMOL/L
AST SERPL-CCNC: 18 U/L
BILIRUB SERPL-MCNC: 0.2 MG/DL
BUN SERPL-MCNC: 19 MG/DL
CALCIUM SERPL-MCNC: 9.7 MG/DL
CHLORIDE SERPL-SCNC: 104 MMOL/L
CO2 SERPL-SCNC: 23 MMOL/L
CREAT SERPL-MCNC: 1.02 MG/DL
CRP SERPL-MCNC: 3 MG/L
EGFRCR SERPLBLD CKD-EPI 2021: 105 ML/MIN/1.73M2
HCT VFR BLD CALC: 43.8 %
HGB BLD-MCNC: 13.8 G/DL
MCHC RBC-ENTMCNC: 27.8 PG
MCHC RBC-ENTMCNC: 31.5 G/DL
MCV RBC AUTO: 88.1 FL
PLATELET # BLD AUTO: 246 K/UL
POTASSIUM SERPL-SCNC: 4.3 MMOL/L
PROT SERPL-MCNC: 7.5 G/DL
RBC # BLD: 4.97 M/UL
RBC # FLD: 13.2 %
SODIUM SERPL-SCNC: 141 MMOL/L
WBC # FLD AUTO: 5.44 K/UL

## 2025-07-20 LAB — CALPROTECTIN FECAL: 479 UG/G

## 2025-07-21 DIAGNOSIS — K50.10 CROHN'S DISEASE OF LARGE INTESTINE W/OUT COMPLICATIONS: ICD-10-CM

## 2025-07-24 ENCOUNTER — NON-APPOINTMENT (OUTPATIENT)
Age: 25
End: 2025-07-24

## 2025-07-28 ENCOUNTER — NON-APPOINTMENT (OUTPATIENT)
Age: 25
End: 2025-07-28

## 2025-09-03 ENCOUNTER — RX RENEWAL (OUTPATIENT)
Age: 25
End: 2025-09-03